# Patient Record
(demographics unavailable — no encounter records)

---

## 2024-12-20 NOTE — ASSESSMENT
[FreeTextEntry1] : This is a 20-year-old man who has been having perceived difficulty with concentration and focusing on tasks. He has been functioning at a very high level however. He is currently in college getting mainly A's and B's in his courses. He had no issues as a young child according to his mother.  I will obtain an MRI of the brain to rule out any structural pathology. I will obtain an EEG to rule out any epileptiform abnormalities. I will refer him to a neuropsychologist for further assessment as well. If the neuropsychologist has concerns for attention deficit, then it can be discussed further.  At present I would not suggest any medication.

## 2024-12-20 NOTE — HISTORY OF PRESENT ILLNESS
[FreeTextEntry1] : I saw this patient in the office today. He presents with his mother.  He reports that he has difficulty with attention and concentration and focusing on tasks. He recalls that back in fifth grade when he was doing math quizzes based on speed, he was not able to do it as quickly as other children. His mother reports that he had no major issues as a young child. He is currently in college and is getting mainly A's and B's in his courses.  He was referred here for evaluation for attention deficit disorder.

## 2024-12-20 NOTE — CONSULT LETTER
[Dear  ___] : Dear  [unfilled], [Courtesy Letter:] : I had the pleasure of seeing your patient, [unfilled], in my office today. [Please see my note below.] : Please see my note below. [Consult Closing:] : Thank you very much for allowing me to participate in the care of this patient.  If you have any questions, please do not hesitate to contact me. [Sincerely,] : Sincerely, [FreeTextEntry3] : Toñito Sharpe MD.

## 2024-12-20 NOTE — PHYSICAL EXAM
[General Appearance - Alert] : alert [General Appearance - In No Acute Distress] : in no acute distress [Affect] : the affect was normal [Oriented To Time, Place, And Person] : oriented to person, place, and time [Memory Recent] : recent memory was not impaired [Memory Remote] : remote memory was not impaired [Cranial Nerves Optic (II)] : visual acuity intact bilaterally,  visual fields full to confrontation, pupils equal round and reactive to light [Cranial Nerves Oculomotor (III)] : extraocular motion intact [Cranial Nerves Trigeminal (V)] : facial sensation intact symmetrically [Cranial Nerves Vestibulocochlear (VIII)] : hearing was intact bilaterally [Cranial Nerves Facial (VII)] : face symmetrical [Cranial Nerves Glossopharyngeal (IX)] : tongue and palate midline [Cranial Nerves Accessory (XI - Cranial And Spinal)] : head turning and shoulder shrug symmetric [Cranial Nerves Hypoglossal (XII)] : there was no tongue deviation with protrusion [Motor Tone] : muscle tone was normal in all four extremities [Motor Strength] : muscle strength was normal in all four extremities [Sensation Tactile Decrease] : light touch was intact [Sensation Pain / Temperature Decrease] : pain and temperature was intact [Sensation Vibration Decrease] : vibration was intact [Abnormal Walk] : normal gait [2+] : Ankle jerk left 2+ [Optic Disc Abnormality] : the optic disc were normal in size and color [Dysarthria] : no dysarthria [Aphasia] : no dysphasia/aphasia [Romberg's Sign] : Romberg's sign was negtive [Coordination - Dysmetria Impaired Finger-to-Nose Bilateral] : not present [Plantar Reflex Right Only] : normal on the right [Plantar Reflex Left Only] : normal on the left

## 2025-07-15 NOTE — HISTORY OF PRESENT ILLNESS
[Up to date] : Up to date [Has family members/adults to turn to for help] : has family members/adults to turn to for help [Is permitted and is able to make independent decisions] : Is permitted and is able to make independent decisions [Eats regular meals including adequate fruits and vegetables] : eats regular meals including adequate fruits and vegetables [Drinks non-sweetened liquids] : drinks non-sweetened liquids  [Calcium source] : calcium source [Has friends] : has friends [At least 1 hour of physical activity a day] : at least 1 hour of physical activity a day [Screen time (except homework) less than 2 hours a day] : no screen time (except homework) less than 2 hours a day [Uses electronic nicotine delivery system] : does not use electronic nicotine delivery system [Uses tobacco] : does not use tobacco [Uses drugs] : does not use drugs  [Drinks alcohol] : drinks alcohol [No] : No cigarette smoke exposure [Yes] : Patient has had sexual intercourse. [HIV Screening Declined] : HIV Screening Declined [With Teen] : teen [FreeTextEntry7] : 21 y.o [facility].  Patient notes L testicular pain when he lies down.  Started in Jan.  In Mar went to UC and was given antibiotics which helped but did not resolve the issue.  Denies urinary symptoms.  Did endorse some discharge but not recently.    [de-identified] : College student [de-identified] : Rare marijuana, EtOH ~3 drinks per occasion, not using substances and driving [de-identified] : States mood is up and down, particularly difficult right now as went through a break up.  Plans to engage with counseling, already has names.   [FreeTextEntry1] : - Coordination of care form reviewed. - Cardiac screening is negative. - Discussed 5-2-1-0 questionnaire with parent (and patient, if age appropriate and able to comprehend.)  Concerns and issues addressed if indicated.   - CRAFFT form and PHQ reviewed.

## 2025-07-15 NOTE — HISTORY OF PRESENT ILLNESS
[Up to date] : Up to date [Has family members/adults to turn to for help] : has family members/adults to turn to for help [Is permitted and is able to make independent decisions] : Is permitted and is able to make independent decisions [Eats regular meals including adequate fruits and vegetables] : eats regular meals including adequate fruits and vegetables [Drinks non-sweetened liquids] : drinks non-sweetened liquids  [Calcium source] : calcium source [Has friends] : has friends [At least 1 hour of physical activity a day] : at least 1 hour of physical activity a day [Screen time (except homework) less than 2 hours a day] : no screen time (except homework) less than 2 hours a day [Uses electronic nicotine delivery system] : does not use electronic nicotine delivery system [Uses tobacco] : does not use tobacco [Uses drugs] : does not use drugs  [Drinks alcohol] : drinks alcohol [No] : No cigarette smoke exposure [Yes] : Patient has had sexual intercourse. [HIV Screening Declined] : HIV Screening Declined [With Teen] : teen [FreeTextEntry7] : 21 y.o [facility].  Patient notes L testicular pain when he lies down.  Started in Jan.  In Mar went to UC and was given antibiotics which helped but did not resolve the issue.  Denies urinary symptoms.  Did endorse some discharge but not recently.    [de-identified] : College student [de-identified] : Rare marijuana, EtOH ~3 drinks per occasion, not using substances and driving [de-identified] : States mood is up and down, particularly difficult right now as went through a break up.  Plans to engage with counseling, already has names.   [FreeTextEntry1] : - Coordination of care form reviewed. - Cardiac screening is negative. - Discussed 5-2-1-0 questionnaire with parent (and patient, if age appropriate and able to comprehend.)  Concerns and issues addressed if indicated.   - CRAFFT form and PHQ reviewed.

## 2025-07-15 NOTE — HISTORY OF PRESENT ILLNESS
[Up to date] : Up to date [Has family members/adults to turn to for help] : has family members/adults to turn to for help [Is permitted and is able to make independent decisions] : Is permitted and is able to make independent decisions [Eats regular meals including adequate fruits and vegetables] : eats regular meals including adequate fruits and vegetables [Drinks non-sweetened liquids] : drinks non-sweetened liquids  [Calcium source] : calcium source [Has friends] : has friends [At least 1 hour of physical activity a day] : at least 1 hour of physical activity a day [Screen time (except homework) less than 2 hours a day] : no screen time (except homework) less than 2 hours a day [Uses electronic nicotine delivery system] : does not use electronic nicotine delivery system [Uses tobacco] : does not use tobacco [Uses drugs] : does not use drugs  [Drinks alcohol] : drinks alcohol [No] : No cigarette smoke exposure [Yes] : Patient has had sexual intercourse. [HIV Screening Declined] : HIV Screening Declined [With Teen] : teen [FreeTextEntry7] : 21 y.o [facility].  Patient notes L testicular pain when he lies down.  Started in Jan.  In Mar went to UC and was given antibiotics which helped but did not resolve the issue.  Denies urinary symptoms.  Did endorse some discharge but not recently.    [de-identified] : College student [de-identified] : Rare marijuana, EtOH ~3 drinks per occasion, not using substances and driving [de-identified] : States mood is up and down, particularly difficult right now as went through a break up.  Plans to engage with counseling, already has names.   [FreeTextEntry1] : - Coordination of care form reviewed. - Cardiac screening is negative. - Discussed 5-2-1-0 questionnaire with parent (and patient, if age appropriate and able to comprehend.)  Concerns and issues addressed if indicated.   - CRAFFT form and PHQ reviewed.

## 2025-07-15 NOTE — DISCUSSION/SUMMARY
[FreeTextEntry1] : - UA WNL, discussed increasing hydration - Script given for US, will call with results - Urology referral placed for continued pain - Aware to transition to care of an adult PCP

## 2025-07-15 NOTE — PHYSICAL EXAM
